# Patient Record
Sex: FEMALE | Race: WHITE | URBAN - METROPOLITAN AREA
[De-identification: names, ages, dates, MRNs, and addresses within clinical notes are randomized per-mention and may not be internally consistent; named-entity substitution may affect disease eponyms.]

---

## 2017-02-01 ENCOUNTER — TELEPHONE (OUTPATIENT)
Dept: INTERNAL MEDICINE CLINIC | Facility: CLINIC | Age: 67
End: 2017-02-01

## 2017-02-01 NOTE — TELEPHONE ENCOUNTER
Clarisa from [employer], states they got attending order for Pt's home health. States orders where received from the ER. Clarisa wants to make sire  will be the attending physician.

## 2017-02-02 NOTE — TELEPHONE ENCOUNTER
Phone call to Clarisa from [employer]. Phone went to her personal voicemail. Message left stating Dr Nirmala Stephens is the pcp for this patient. Advised to call office with any further questions. Office number given.

## 2017-02-16 ENCOUNTER — OFFICE VISIT (OUTPATIENT)
Dept: INTERNAL MEDICINE CLINIC | Facility: CLINIC | Age: 67
End: 2017-02-16

## 2017-02-16 VITALS
TEMPERATURE: 98 F | HEIGHT: 58 IN | DIASTOLIC BLOOD PRESSURE: 54 MMHG | SYSTOLIC BLOOD PRESSURE: 150 MMHG | WEIGHT: 187 LBS | HEART RATE: 75 BPM | RESPIRATION RATE: 20 BRPM | BODY MASS INDEX: 39.25 KG/M2

## 2017-02-16 DIAGNOSIS — R60.9 EDEMA, UNSPECIFIED TYPE: Primary | ICD-10-CM

## 2017-02-16 DIAGNOSIS — I25.10 CORONARY ARTERY DISEASE DUE TO LIPID RICH PLAQUE: ICD-10-CM

## 2017-02-16 DIAGNOSIS — I25.83 CORONARY ARTERY DISEASE DUE TO LIPID RICH PLAQUE: ICD-10-CM

## 2017-02-16 PROCEDURE — 99213 OFFICE O/P EST LOW 20 MIN: CPT | Performed by: INTERNAL MEDICINE

## 2017-02-16 PROCEDURE — G0463 HOSPITAL OUTPT CLINIC VISIT: HCPCS | Performed by: INTERNAL MEDICINE

## 2017-02-16 RX ORDER — ASPIRIN 81 MG/1
81 TABLET, CHEWABLE ORAL
COMMUNITY
Start: 2017-01-25

## 2017-02-16 RX ORDER — LISINOPRIL 5 MG/1
5 TABLET ORAL DAILY
COMMUNITY
Start: 2017-02-02

## 2017-02-16 RX ORDER — ATORVASTATIN CALCIUM 80 MG/1
80 TABLET, FILM COATED ORAL NIGHTLY
COMMUNITY
Start: 2017-02-02

## 2017-02-16 RX ORDER — CARVEDILOL 6.25 MG/1
6.25 TABLET ORAL 2 TIMES DAILY
COMMUNITY
Start: 2017-02-02

## 2017-02-16 RX ORDER — FUROSEMIDE 40 MG/1
40 TABLET ORAL DAILY
COMMUNITY
Start: 2017-02-02

## 2017-02-16 NOTE — PROGRESS NOTES
Left heart catheterization. A catheter was advanced to the ascending aorta. After recording ascending aortic  pressure, the catheter was advanced across the aortic valve and left ventricular pressure was recorded.     --  Right and left coronary angiograp 20:48. Heparin, 3000 units, IV, last dose at 21:04. Arrival Nitroglycerin,  infusion rate of 3 mcg/min, IV, at 20:31. 1% Lidocaine, 3 ml, subcutaneously, at 20:37. INTEGRILLIN(EPTIFIBATIDE), 180  mcg/kg, IV, at 20:49.  INTEGRILLIN (EPTIFIBATIDE) 0.75, infus Signed)    Final Date:   23 January 2017 11:18      Result Narrative   Is patient pregnant?->No. If no contraindications, Definity or Optison may be   used. Does patient have a cardiac shunt?->No. If no contraindications       Pt was hospitalized at [facility] (formerly [facility]) fo plaque  F/u [facility] cardiology and rehab

## 2017-03-02 RX ORDER — MIRTAZAPINE 15 MG/1
TABLET, FILM COATED ORAL
Qty: 30 TABLET | Refills: 0 | Status: SHIPPED | OUTPATIENT
Start: 2017-03-02 | End: 2017-04-06

## 2017-04-07 RX ORDER — MIRTAZAPINE 15 MG/1
TABLET, FILM COATED ORAL
Qty: 30 TABLET | Refills: 11 | Status: SHIPPED | OUTPATIENT
Start: 2017-04-07

## (undated) NOTE — MR AVS SNAPSHOT
[facility] SPECIALTY [facility] - [place] [address] [place]  [id number] [phone]               Thank you for choosing us for your health care visit with Landy Homans, MD.  We are glad to serve you and happy to provide you with this summary o ticagrelor 90 MG Tabs   Take 90 mg by mouth.    Commonly known as:  BRILINTA                   Today's Orders     DME - External    Complete by:  As directed    Assoc Dx:  Edema, unspecified type [R60.9]                 Referral Details     Referred By office, you can view your past visit information in [facility] by going to Visits < Visit Summaries. [facility] questions? Call [phone] for help. [facility] is NOT to be used for urgent needs. For medical emergencies, dial 911.            Visit [facility]